# Patient Record
Sex: MALE | Race: WHITE | NOT HISPANIC OR LATINO | Employment: FULL TIME | URBAN - METROPOLITAN AREA
[De-identification: names, ages, dates, MRNs, and addresses within clinical notes are randomized per-mention and may not be internally consistent; named-entity substitution may affect disease eponyms.]

---

## 2023-12-11 ENCOUNTER — OFFICE VISIT (OUTPATIENT)
Dept: URGENT CARE | Facility: CLINIC | Age: 33
End: 2023-12-11

## 2023-12-11 VITALS
HEART RATE: 78 BPM | DIASTOLIC BLOOD PRESSURE: 82 MMHG | TEMPERATURE: 98.2 F | OXYGEN SATURATION: 97 % | RESPIRATION RATE: 14 BRPM | SYSTOLIC BLOOD PRESSURE: 111 MMHG

## 2023-12-11 DIAGNOSIS — H66.003 NON-RECURRENT ACUTE SUPPURATIVE OTITIS MEDIA OF BOTH EARS WITHOUT SPONTANEOUS RUPTURE OF TYMPANIC MEMBRANES: ICD-10-CM

## 2023-12-11 DIAGNOSIS — J02.9 ACUTE PHARYNGITIS, UNSPECIFIED ETIOLOGY: Primary | ICD-10-CM

## 2023-12-11 LAB — POC RAPID STREP: NEGATIVE

## 2023-12-11 PROCEDURE — 87880 STREP A ASSAY W/OPTIC: CPT | Performed by: PHYSICIAN ASSISTANT

## 2023-12-11 PROCEDURE — 99212 OFFICE O/P EST SF 10 MIN: CPT | Mod: 25 | Performed by: PHYSICIAN ASSISTANT

## 2023-12-11 RX ORDER — AMOXICILLIN 500 MG/1
500 CAPSULE ORAL 2 TIMES DAILY
Qty: 20 CAPSULE | Refills: 0 | Status: SHIPPED | OUTPATIENT
Start: 2023-12-11 | End: 2023-12-18

## 2023-12-11 NOTE — PROGRESS NOTES
[facility] [place] NOTE:      Name: Piter Buchanan, 33 y.o.    CSN:[id number]   MRN:[id number]    PCP: No primary care provider on file.    ALL:  No Known Allergies    History:    Chief Complaint: Vomiting and Sore Throat (X 1 WEEK/L EAR PAIN X TODAY)    Encounter Date: 12/11/2023  18:00hrs    HPI: The history was obtained from the patient. Piter is a 33 y.o. male, who presents with a chief complaint of Vomiting and Sore Throat (X 1 WEEK/L EAR PAIN X TODAY) has had positive exposure to strep by his youngest children, patient also mentions that they have been having some recurrent viral-like illness for the last 3 weeks.  He is now experiencing some left greater than right ear pain and discomfort, he is throat is bothering him and he is got significant congestion.  He has been using Mucinex with modest relief.  Denies any notable dizziness, headache, vision change, nausea, vomiting or diarrhea.    PMHx:    History reviewed. No pertinent past medical history.           No current outpatient medications on file.     No current facility-administered medications for this visit.         PMSx:  History reviewed. No pertinent surgical history.    Fam Hx: No family history on file.    SOC. Hx:     Social History     Socioeconomic History    Marital status:      Spouse name: Not on file    Number of children: Not on file    Years of education: Not on file    Highest education level: Not on file   Occupational History    Not on file   Tobacco Use    Smoking status: Never    Smokeless tobacco: Never   Substance and Sexual Activity    Alcohol use: Not on file    Drug use: Not on file    Sexual activity: Not on file   Other Topics Concern    Not on file   Social History Narrative    Not on file     Social Determinants of Health     Financial Resource Strain: Not on file   Food Insecurity: Not on file   Transportation Needs: Not on file   Physical Activity: Not on file   Stress: Not on  file   Social Connections: Not on file   Intimate Partner Violence: Not on file   Housing Stability: Not on file         Vitals:    12/11/23 1736   BP: 111/82   Pulse: 78   Resp: 14   Temp: 36.8 °C (98.2 °F)   SpO2: 97%                Physical Exam  Constitutional:       Appearance: Normal appearance. He is normal weight.   HENT:      Head: Normocephalic and atraumatic.      Right Ear: No middle ear effusion. There is no impacted cerumen. No foreign body. No mastoid tenderness. Tympanic membrane is erythematous and bulging.      Left Ear:  No middle ear effusion. There is no impacted cerumen. No foreign body. No mastoid tenderness. Tympanic membrane is erythematous and bulging.      Nose: Congestion present.      Mouth/Throat:      Mouth: Mucous membranes are moist.      Pharynx: Posterior oropharyngeal erythema present.   Eyes:      Extraocular Movements: Extraocular movements intact.   Cardiovascular:      Rate and Rhythm: Normal rate and regular rhythm.   Pulmonary:      Effort: Pulmonary effort is normal.      Breath sounds: Normal breath sounds.   Abdominal:      General: Abdomen is flat.   Musculoskeletal:         General: Normal range of motion.      Cervical back: Normal range of motion and neck supple.   Skin:     General: Skin is warm.   Neurological:      Mental Status: He is alert and oriented to person, place, and time.   Psychiatric:         Behavior: Behavior normal.         LABORATORY @ RADIOLOGICAL IMAGING (if done):     Results for orders placed or performed in visit on 12/11/23 (from the past 24 hour(s))   POCT rapid strep A manually resulted   Result Value Ref Range    POC Rapid Strep Negative Negative       UC COURSE/MEDICAL DECISION MAKING:    Piter is a 33 y.o., who presents with a working diagnosis of   1. Acute pharyngitis, unspecified etiology    2. Non-recurrent acute suppurative otitis media of both ears without spontaneous rupture of tympanic membranes     with a differential to include:  Influenza, parainfluenza, rhinovirus, adenovirus, metapneumovirus, coronavirus, COVID-19, postnasal drip, strep pharyngitis, GERD, retropharyngeal abscess, tonsillitis, adenitis, seasonal allergies    Supportive care will be provided in the form of over-the-counter meds including pseudoephedrine and Claritin and/or Astepro.  Encouraged to push fluids, amoxicillin be provided if symptoms persist he is recommended to take this given the left ear findings.          Yohannes Pizarro PA-C   Advanced Practice Provider  [facility]

## 2024-11-07 ENCOUNTER — APPOINTMENT (OUTPATIENT)
Dept: UROLOGY | Facility: CLINIC | Age: 34
End: 2024-11-07

## 2024-11-07 VITALS
HEIGHT: 71 IN | BODY MASS INDEX: 29.12 KG/M2 | DIASTOLIC BLOOD PRESSURE: 89 MMHG | SYSTOLIC BLOOD PRESSURE: 132 MMHG | WEIGHT: 208 LBS | HEART RATE: 75 BPM

## 2024-11-07 DIAGNOSIS — Z30.09 ENCOUNTER FOR VASECTOMY ASSESSMENT: ICD-10-CM

## 2024-11-07 PROCEDURE — 1036F TOBACCO NON-USER: CPT | Performed by: UROLOGY

## 2024-11-07 PROCEDURE — 3008F BODY MASS INDEX DOCD: CPT | Performed by: UROLOGY

## 2024-11-07 PROCEDURE — 99203 OFFICE O/P NEW LOW 30 MIN: CPT | Performed by: UROLOGY

## 2024-11-07 ASSESSMENT — ENCOUNTER SYMPTOMS
PSYCHIATRIC NEGATIVE: 1
CHILLS: 0
SHORTNESS OF BREATH: 0
ALLERGIC/IMMUNOLOGIC NEGATIVE: 1
EYES NEGATIVE: 1
COUGH: 0
NAUSEA: 0
ENDOCRINE NEGATIVE: 1
FEVER: 0
DIFFICULTY URINATING: 0

## 2024-11-07 NOTE — PROGRESS NOTES
Subjective   Patient ID: Piter Buchanan is a 34 y.o. male.    HPI  Patient is here for vas consult. He is  with 5 children.  No LUT'S sx.       Review of Systems   Constitutional:  Negative for chills and fever.   HENT: Negative.     Eyes: Negative.    Respiratory:  Negative for cough and shortness of breath.    Cardiovascular:  Negative for chest pain and leg swelling.   Gastrointestinal:  Negative for nausea.   Endocrine: Negative.    Genitourinary:  Negative for difficulty urinating.        Negative except for documented in HPI   Allergic/Immunologic: Negative.    Neurological:         Alert & oriented X 3   Hematological:         Denies blood thinners   Psychiatric/Behavioral: Negative.         Objective   Physical Exam  Vitals and nursing note reviewed.   Constitutional:       General: He is not in acute distress.     Appearance: Normal appearance.   Pulmonary:      Effort: Pulmonary effort is normal.   Abdominal:      Tenderness: There is no abdominal tenderness.   Genitourinary:     Comments: Kidneys non palpable bilaterally  Bladder non palpable or tender  Scrotum no mass, No hydrocele. VAS palpable bilaterally.  Epididymis- No spermatocele. Non Tender.  Testicles: No mass  Urethra: No discharge  Penis within normal limits... No lesions. uncircumcised  Prostate - deferred  Neurological:      Mental Status: He is alert.       Assessment/Plan       Diagnoses and all orders for this visit:  Encounter for vasectomy assessment      Pros/cons of vasectomy reviewed.   Questions answered.   Valium Rx offered-declined.   Observe mild LUTS.   F/U vas in office

## 2024-11-20 ENCOUNTER — APPOINTMENT (OUTPATIENT)
Dept: PRIMARY CARE | Facility: CLINIC | Age: 34
End: 2024-11-20

## 2024-11-20 PROCEDURE — FAA01 PR FAA1 EXAM CLASS I II III: Performed by: FAMILY MEDICINE

## 2024-12-16 ENCOUNTER — APPOINTMENT (OUTPATIENT)
Dept: UROLOGY | Facility: CLINIC | Age: 34
End: 2024-12-16

## 2025-01-29 NOTE — PROGRESS NOTES
Patient ID: Piter Buchanan is a 34 y.o. male.    Procedures  The patient was prepped and draped in the standard surgical fashion. 1% Lidocaine was injected into the scrotum. A small scrotal excision was made and the vas deferens brought through the incision. We then dissected the vas deferens free of its surroundings attachments and three clips were placed on the vas deferens. A section of the vas deferens was then excised. We then assured that adequate hemostasis was obtained. I closed the excision with a single chromic suture. The identical procedure was performed on the opposite side. The patient tolerated the procedure well and there were no complications. The patient was instructed on post-operative care as well as the importance of dropping off a semen analysis. The post-operative instructions were given to the patient in writing as well.       FOLLOW UP PRN

## 2025-02-06 ENCOUNTER — APPOINTMENT (OUTPATIENT)
Dept: UROLOGY | Facility: CLINIC | Age: 35
End: 2025-02-06

## 2025-02-06 DIAGNOSIS — Z30.2 ADMISSION FOR VASECTOMY: ICD-10-CM

## 2025-02-06 PROCEDURE — 55250 REMOVAL OF SPERM DUCT(S): CPT | Performed by: UROLOGY

## 2025-02-06 RX ORDER — HYDROCODONE BITARTRATE AND ACETAMINOPHEN 5; 325 MG/1; MG/1
1 TABLET ORAL EVERY 6 HOURS PRN
Qty: 12 TABLET | Refills: 0 | Status: SHIPPED | OUTPATIENT
Start: 2025-02-06 | End: 2025-02-11

## 2025-04-29 ENCOUNTER — TELEPHONE (OUTPATIENT)
Dept: UROLOGY | Facility: CLINIC | Age: 35
End: 2025-04-29
